# Patient Record
Sex: FEMALE | Race: OTHER
[De-identification: names, ages, dates, MRNs, and addresses within clinical notes are randomized per-mention and may not be internally consistent; named-entity substitution may affect disease eponyms.]

---

## 2019-11-30 ENCOUNTER — HOSPITAL ENCOUNTER (EMERGENCY)
Dept: HOSPITAL 61 - ER | Age: 39
Discharge: HOME | End: 2019-11-30
Payer: SELF-PAY

## 2019-11-30 VITALS — DIASTOLIC BLOOD PRESSURE: 88 MMHG | SYSTOLIC BLOOD PRESSURE: 143 MMHG

## 2019-11-30 VITALS — BODY MASS INDEX: 30.05 KG/M2 | WEIGHT: 176 LBS | HEIGHT: 64 IN

## 2019-11-30 DIAGNOSIS — J02.9: Primary | ICD-10-CM

## 2019-11-30 DIAGNOSIS — J45.909: ICD-10-CM

## 2019-11-30 DIAGNOSIS — Z91.040: ICD-10-CM

## 2019-11-30 PROCEDURE — 99283 EMERGENCY DEPT VISIT LOW MDM: CPT

## 2019-11-30 NOTE — PHYS DOC
Past Medical History


Past Medical History:  Asthma


 (HODA RAMOS)


Past Surgical History:  Tubal ligation


 (HODA RAMOS)


Alcohol Use:  Occasionally


Drug Use:  None


 (HODA RAMOS)


Attending Signature


I have participated in the care of this patient and I have reviewed and agree 

with all pertinent clinical information above including history, exam, and 

recommendations.





 (ANDRIA BENAVIDES MD)





Adult General


Chief Complaint


Chief Complaint:  SORE THROAT





HPI


HPI





Patient is a 39  year old female who presents to the emergency department with 

complaints of sore throat for the last 5 days. Patient states that she has also 

felt like she's had a fever. She denies any cough, shortness of breath, 

wheezing, nausea, vomiting, diarrhea, abdominal pain, or difficulty swallowing. 

Patient states that both of her ears also hurt. She currently rates her 

discomfort a 5 out of 10 on the pain scale. She denies taking anything for 

relief of her fever or pain prior to arrival. All other ROS is neg unless 

otherwise noted in HPI.


 (HODA RAMOS)





Review of Systems


Review of Systems


See Above


 (HODA RAMOS)





Current Medications


Current Medications





Current Medications








 Medications


  (Trade)  Dose


 Ordered  Sig/Lakeisha  Start Time


 Stop Time Status Last Admin


Dose Admin


 


 Acetaminophen


  (Tylenol)  1,000 mg  1X  ONCE  11/30/19 19:30


 11/30/19 19:31 DC 11/30/19 19:24


1,000 MG





 (ANDRIA BENAVIDES MD)





Allergies


Allergies





Allergies








Coded Allergies Type Severity Reaction Last Updated Verified


 


  latex Allergy Severe Hives 11/30/19 Yes





 (ANDRIA BENAVIDES MD)





Physical Exam


Physical Exam


See Above


Constitutional: Well developed, well nourished, no acute distress, non-toxic 

appearance. []


HENT: Normocephalic, atraumatic, bilateral external ears normal, 2+ tonsils 

bilaterally with moderate erythema of posterior pharynx, oropharynx moist, no 

oral exudates, nose normal. []


Eyes: PERRLA, EOMI, conjunctiva normal, no discharge. [] 


Neck: Normal range of motion, bilateral enlarged anterior cervical chain lymph 

nodes, no stridor. [] 


Cardiovascular:Heart rate regular rhythm, no murmur []


Lungs & Thorax:  Bilateral breath sounds clear to auscultation []


Skin: Flushed, hot, dry; no rash


Back: No tenderness


Extremities: No cyanosis, ROM intact


Neurologic: Alert and oriented X 3, no focal deficits noted. []


Psychologic: Affect normal, judgement normal, mood normal. []


 (HODA RAMOS)





Current Patient Data


Vital Signs





                                   Vital Signs








  Date Time  Temp Pulse Resp B/P (MAP) Pulse Ox O2 Delivery O2 Flow Rate FiO2


 


11/30/19 18:55 100.4 93 16 143/88 (106) 98 Room Air  





 100.4       





 (ANDRIA BENAVIDES MD)





EKG


EKG


[]


 (HODA RAMOS)





Radiology/Procedures


Radiology/Procedures


[]


 (HODA RAMOS)





Course & Med Decision Making


Course & Med Decision Making


Pertinent Labs and Imaging studies reviewed. (See chart for details)


 is a 39-year-old female who presented to the emergency department with 

complaints of a sore throat for the last 5 days. Patient had a fever temperature

 was 100.4 in the department. Physical exam is concerning for strep pharyngitis.

 Decision to treat patient based off the center criteria was made. Prescription 

written for amoxicillin. Patient was given 1 g of Tylenol while in the emergency

 department. Recommend salt water gargles, Tylenol or ibuprofen as needed for 

fever or pain. Follow-up with her primary care doctor next week. Return to the 

ER symptoms worsen.





Patient verbalized an understanding of home care, medications, follow-up, and 

return to ED instructions and was in agreement with the plan of care.


[]


 (HODA RAMOS)





Dragon Disclaimer


Dragon Disclaimer


This electronic medical record was generated, in whole or in part, using a voice

 recognition dictation system.


 (HODA RAMOS)





Departure


Departure


Impression:  


   Primary Impression:  


   Pharyngitis, acute


   Additional Impression:  


   Fever


Disposition:  01 HOME, SELF-CARE


Condition:  STABLE


Referrals:  


NO PCP (PCP)


Patient Instructions:  Fever, Adult, Easy-to-Read, Viral and Bacterial 

Pharyngitis, Easy-to-Read





Additional Instructions:  


Fill prescription and use as directed. Recommend warm salt water gargles as 

needed for relief of discomfort. Alternate Tylenol and ibuprofen as needed for 

fever/pain. Discard your toothbrush tomorrow and begin using a new toothbrush. 

Follow-up with primary care doctor if symptoms persist. Return to the ER if 

symptoms worsen.


Scripts


Amoxicillin (AMOXICILLIN) 500 Mg Tablet


1 TAB PO BID for 10 Days, #20 TAB 0 Refills


   Prov: HODA RAMOS         11/30/19





Problem Qualifiers








   Primary Impression:  


   Pharyngitis, acute


   Pharyngitis/tonsillitis etiology:  unspecified etiology  Qualified Codes:  

   J02.9 - Acute pharyngitis, unspecified


   Additional Impression:  


   Fever


   Fever type:  unspecified  Qualified Codes:  R50.9 - Fever, unspecified








HODA RAMOS       Nov 30, 2019 19:21


ANDRIA BENAVIDES MD              Nov 30, 2019 23:49

## 2021-11-10 ENCOUNTER — HOSPITAL ENCOUNTER (EMERGENCY)
Dept: HOSPITAL 61 - ER | Age: 41
Discharge: HOME | End: 2021-11-10
Payer: SELF-PAY

## 2021-11-10 VITALS — DIASTOLIC BLOOD PRESSURE: 87 MMHG | SYSTOLIC BLOOD PRESSURE: 140 MMHG

## 2021-11-10 VITALS — HEIGHT: 64 IN | WEIGHT: 189.38 LBS | BODY MASS INDEX: 32.33 KG/M2

## 2021-11-10 DIAGNOSIS — Z98.51: ICD-10-CM

## 2021-11-10 DIAGNOSIS — J45.909: ICD-10-CM

## 2021-11-10 DIAGNOSIS — N94.6: ICD-10-CM

## 2021-11-10 DIAGNOSIS — D25.0: ICD-10-CM

## 2021-11-10 DIAGNOSIS — N83.202: Primary | ICD-10-CM

## 2021-11-10 LAB
ALBUMIN SERPL-MCNC: 3.3 G/DL (ref 3.4–5)
ALBUMIN/GLOB SERPL: 1 {RATIO} (ref 1–1.7)
ALP SERPL-CCNC: 71 U/L (ref 46–116)
ALT SERPL-CCNC: 23 U/L (ref 14–59)
ANION GAP SERPL CALC-SCNC: 9 MMOL/L (ref 6–14)
APTT PPP: YELLOW S
AST SERPL-CCNC: 15 U/L (ref 15–37)
BACTERIA #/AREA URNS HPF: (no result) /HPF
BASOPHILS # BLD AUTO: 0 X10^3/UL (ref 0–0.2)
BASOPHILS NFR BLD: 0 % (ref 0–3)
BILIRUB SERPL-MCNC: 0.2 MG/DL (ref 0.2–1)
BILIRUB UR QL STRIP: NEGATIVE
BUN SERPL-MCNC: 12 MG/DL (ref 7–20)
BUN/CREAT SERPL: 20 (ref 6–20)
CALCIUM SERPL-MCNC: 7.9 MG/DL (ref 8.5–10.1)
CHLORIDE SERPL-SCNC: 102 MMOL/L (ref 98–107)
CO2 SERPL-SCNC: 26 MMOL/L (ref 21–32)
CREAT SERPL-MCNC: 0.6 MG/DL (ref 0.6–1)
EOSINOPHIL NFR BLD: 0.1 X10^3/UL (ref 0–0.7)
EOSINOPHIL NFR BLD: 1 % (ref 0–3)
ERYTHROCYTE [DISTWIDTH] IN BLOOD BY AUTOMATED COUNT: 16.3 % (ref 11.5–14.5)
FIBRINOGEN PPP-MCNC: CLEAR MG/DL
GFR SERPLBLD BASED ON 1.73 SQ M-ARVRAT: 110.7 ML/MIN
GLUCOSE SERPL-MCNC: 94 MG/DL (ref 70–99)
HCT VFR BLD CALC: 31 % (ref 36–47)
HGB BLD-MCNC: 10.1 G/DL (ref 12–15.5)
LIPASE: 82 U/L (ref 73–393)
LYMPHOCYTES # BLD: 2.1 X10^3/UL (ref 1–4.8)
LYMPHOCYTES NFR BLD AUTO: 20 % (ref 24–48)
MCH RBC QN AUTO: 25 PG (ref 25–35)
MCHC RBC AUTO-ENTMCNC: 32 G/DL (ref 31–37)
MCV RBC AUTO: 77 FL (ref 79–100)
MONO #: 0.6 X10^3/UL (ref 0–1.1)
MONOCYTES NFR BLD: 6 % (ref 0–9)
NEUT #: 7.6 X10^3/UL (ref 1.8–7.7)
NEUTROPHILS NFR BLD AUTO: 73 % (ref 31–73)
NITRITE UR QL STRIP: NEGATIVE
PH UR STRIP: 6 [PH]
PLATELET # BLD AUTO: 265 X10^3/UL (ref 140–400)
POTASSIUM SERPL-SCNC: 4.1 MMOL/L (ref 3.5–5.1)
PROT SERPL-MCNC: 6.6 G/DL (ref 6.4–8.2)
PROT UR STRIP-MCNC: NEGATIVE MG/DL
RBC # BLD AUTO: 4.04 X10^6/UL (ref 3.5–5.4)
RBC #/AREA URNS HPF: (no result) /HPF (ref 0–2)
SODIUM SERPL-SCNC: 137 MMOL/L (ref 136–145)
UROBILINOGEN UR-MCNC: 0.2 MG/DL
WBC # BLD AUTO: 10.4 X10^3/UL (ref 4–11)
WBC #/AREA URNS HPF: (no result) /HPF (ref 0–4)

## 2021-11-10 PROCEDURE — 96374 THER/PROPH/DIAG INJ IV PUSH: CPT

## 2021-11-10 PROCEDURE — 96375 TX/PRO/DX INJ NEW DRUG ADDON: CPT

## 2021-11-10 PROCEDURE — 76830 TRANSVAGINAL US NON-OB: CPT

## 2021-11-10 PROCEDURE — 85025 COMPLETE CBC W/AUTO DIFF WBC: CPT

## 2021-11-10 PROCEDURE — 96361 HYDRATE IV INFUSION ADD-ON: CPT

## 2021-11-10 PROCEDURE — 74177 CT ABD & PELVIS W/CONTRAST: CPT

## 2021-11-10 PROCEDURE — 96376 TX/PRO/DX INJ SAME DRUG ADON: CPT

## 2021-11-10 PROCEDURE — 81025 URINE PREGNANCY TEST: CPT

## 2021-11-10 PROCEDURE — 83690 ASSAY OF LIPASE: CPT

## 2021-11-10 PROCEDURE — 99285 EMERGENCY DEPT VISIT HI MDM: CPT

## 2021-11-10 PROCEDURE — 81001 URINALYSIS AUTO W/SCOPE: CPT

## 2021-11-10 PROCEDURE — 80053 COMPREHEN METABOLIC PANEL: CPT

## 2021-11-10 PROCEDURE — 36415 COLL VENOUS BLD VENIPUNCTURE: CPT

## 2021-11-10 NOTE — PHYS DOC
Past Medical History


Past Medical History:  Asthma


Past Surgical History:  Tubal ligation


Smoking Status:  Never Smoker


Alcohol Use:  None


Drug Use:  None





General Adult


EDM:


Chief Complaint:  PELVIC PAIN





HPI:


HPI:





Patient is a 41  year old female who presents with for last 3 months every time 

she starts her period they become very heavy with large clots and tissue passing

and she is in extreme severe pain in the lower abdomen and then radiates over to

the left. She states that she just started her period yesterday and it began 

again. She states right now she is she is bleeding lightly but it will get 

heavy. She states she does not have a gynecologist and has not been seen for 

this in the past. She has had a tubal ligation and a history of asthma. She 

rates her pain at a 7 out of 10 and states it's aching and cramping. She states 

that it feels like contractions. She denies any abnormal vaginal discharge or 

concern for sexually transmitted disease. Patient denies fever, nausea, 

vomiting, diarrhea, headache, dizziness, cough, shortness of breath, chest pain,

syncope, focal weakness, numbness or tingling.





Review of Systems:


Review of Systems:


Constitutional:   Denies fever or chills. []


Eyes:   Denies change in visual acuity. []


HENT:   Denies nasal congestion or sore throat. [] 


Respiratory:   Denies cough or shortness of breath. [] 


Cardiovascular:   Denies chest pain or edema. [] 


GI:   + abdominal pain, denies nausea, vomiting, bloody stools or diarrhea. [] 


:  Denies dysuria. + Heavy vaginal bleeding [] 


Musculoskeletal:   Denies back pain or joint pain. [] 


Integument:   Denies rash. [] 


Neurologic:   Denies headache, focal weakness or sensory changes. [] 


Endocrine:   Denies polyuria or polydipsia. [] 


Lymphatic:  Denies swollen glands. [] 


Psychiatric:  Denies depression or anxiety. []





Heart Score:


C/O Chest Pain:  No





Current Medications:





Current Medications








 Medications


  (Trade)  Dose


 Ordered  Sig/Lakeisha  Start Time


 Stop Time Status Last Admin


Dose Admin


 


 Fentanyl Citrate


  (Fentanyl 2ml


 Vial)  25 mcg  1X  ONCE  11/10/21 13:30


 11/10/21 13:31 UNV  





 


 Sodium Chloride  1,000 ml @ 


 1,000 mls/hr  Q1H  11/10/21 13:30


 11/10/21 14:29   














Allergies:


Allergies:





Allergies








Coded Allergies Type Severity Reaction Last Updated Verified


 


  latex Allergy Severe Hives 19 Yes











Physical Exam:


PE:





Constitutional: Well developed, well nourished, no acute distress, non-toxic 

appearance. []


HENT: Normocephalic, atraumatic, bilateral external ears normal, oropharynx 

moist, no oral exudates, nose normal. []


Eyes: PERRLA, EOMI, conjunctiva normal, no discharge. [] 


Neck: Normal range of motion, no tenderness, supple, no stridor. [] 


Cardiovascular:Heart rate regular rhythm, no murmur []


Lungs & Thorax:  Bilateral breath sounds clear to auscultation []


Abdomen: Bowel sounds normal, soft, no tenderness, no masses, no pulsatile 

masses. [] 


Skin: Warm, dry, no erythema, no rash. [] 


Back: No tenderness, no CVA tenderness. [] 


Extremities: No tenderness, no cyanosis, no clubbing, ROM intact, no edema. [] 


Neurologic: Alert and oriented X 3, normal motor function, normal sensory 

function, no focal deficits noted. []


Psychologic: Affect normal, judgement normal, mood normal. []





**Normal physical exam





Current Patient Data:


Labs:





                                Laboratory Tests








Test


 11/10/21


13:13


 


POC Urine HCG, Qualitative


 Hcg negative


(Negative)








Vital Signs:





                                   Vital Signs








  Date Time  Temp Pulse Resp B/P (MAP) Pulse Ox O2 Delivery O2 Flow Rate FiO2


 


11/10/21 12:55 98.5 97 18 161/86 (111) 99 Room Air  





 98.5       











EKG:


EKG:


[]





Radiology/Procedures:


Radiology/Procedures:


[]


Impression:


                            [facility]


                    [address]  [place], [place] [id number] [phone]


                                        


                                 IMAGING REPORT





                                     Signed





PATIENT: AGUSTIN VENTURA: [id number]     MRN#: [id number]


: 1980           LOCATION: ER              AGE: 41


SEX: F                    EXAM DT: 11/10/21         ACCESSION#: [id number]


STATUS: REG ER            ORD. PHYSICIAN: ROYA GARVIN


REASON: ABDOMINAL PAIN, HEAVY VAGINAL BLEEDING


PROCEDURE: CT ABD PELV W/ IV CONTRST ONLY





EXAM: CT Abdomen and Pelvis with IV contrast





CLINICAL HISTORY: ABDOMINAL PAIN, HEAVY VAGINAL BLEEDING





COMPARISON: none





TECHNIQUE: Helical CT of the abdomen and pelvis was performed following the 

administration of intravenous contrast. Axial, coronal and sagittal reformatted 

images were generated.





PQRS compliance statement - One or more of the following individualized dose 

reduction techniques were utilized for this study:


1.  Automated exposure control


2.  Adjustment of the mA and/or kV according to patient size


3.  Use of iterative reconstruction technique





FINDINGS:


Lower Chest: 


Lung bases are clear.





Abdomen and Pelvis: 


No focal liver lesion. Gallbladder is normal. Spleen is unremarkable. Adrenal 

glands are unremarkable. Symmetric nephrograms. No focal renal lesion. No 

hydronephrosis. No hydronephrosis. No hydroureter. Bladder is unremarkable. 





Moderate colonic stool content is seen. Appendix is normal. No small or large 

bowel dilatation. No bowel obstruction. No abdominal pelvic ascites. No 

abdominal pelvic lymphadenopathy. Left adnexal cyst measures 4.4 x 3.1 cm. Mild 

endometrial prominence.





Aorta is normal in caliber. Tubal ligation clips are seen bilaterally.





Bones: 


No aggressive osseous lesion is seen.





IMPRESSION: 


1.  Left adnexal hypodense/cystic lesion, of uncertain clinical significance, 

possibly hemorrhagic/proteinaceous cyst, should be further assessed by ultrasoun

d.


2.  Endometrial prominence, can also be further assessed by ultrasound although 

it likely physiologic.





Electronically signed by: Kailash Valdez MD (11/10/2021 3:53 PM) [id number]














DICTATED and SIGNED BY:     KAILASH VALDEZ MD


DATE:     11/10/21 [id number]





                            [facility]


                    [address] [place] [address]


                                 [phone]


                                        


                                 IMAGING REPORT





                                     Signed





PATIENT: AGUSTIN VENTURAOUNT: [id number]     MRN#: [id number]


: 1980           LOCATION: ER              AGE: 41


SEX: F                    EXAM DT: 11/10/21         ACCESSION#: [id number]


STATUS: REG ER            ORD. PHYSICIAN: ROYA GARVIN


REASON: abnormal ct scan, pelvic pain, heavy bleeding


PROCEDURE: TRANSVAGINAL





EXAM: ULTRASOUND PELVIS





INDICATION: Reason: abnormal ct scan, pelvic pain, heavy bleeding / Spl. 

Instructions:  / History: .  Last menstrual period was 2021.





COMPARISON: CT 11/10/2021





TECHNIQUE: Transvaginal sonography was performed.





FINDINGS:


Uterus measures 8.2 x 5.8 x 5.1 cm. Endometrium is 2.3 cm in thickness.





Right ovary is not visualized secondary to overlying bowel gas.





Left ovary measures 4.3 x 4.2 x 3.3 cm. Within the left ovary there is a simple 

cyst measuring up to 3.6 cm.





Vascular flow identified in the ovaries bilaterally.





IMPRESSION:


1.  Focal endometrial thickening may relate to endometrial polyp or submucosal 

fibroid. Correlation with direct visualization or nonemergent pelvic MRI is 

recommended.


2.  Simple cyst in the left ovary measuring up to 3.6 cm. No evidence for 

torsion. Right ovary is not visualized.





Electronically signed by: Lauri Hays MD (11/10/2021 4:45 PM) [facility]














DICTATED and SIGNED BY:     LAURI HAYS MD


DATE:     11/10/21 [id number]





Course & Med Decision Making:


Course & Med Decision Making


Pertinent Labs and Imaging studies reviewed. (See chart for details)





See HPI. Alert and oriented x4. Ambulatory steady gait. Speaks in full clear 

sentences. No CVA tenderness. Abdomen is soft and nontender. Afebrile. Skin pink

 warm and dry. Mucous membranes moist.





Blood work unremarkable.  Urinalysis shows no infection.





Ultrasound shows: IMPRESSION:


1.  Focal endometrial thickening may relate to endometrial polyp or submucosal 

fibroid. Correlation with direct visualization or nonemergent pelvic MRI is 

recommended.


2.  Simple cyst in the left ovary measuring up to 3.6 cm. No evidence for 

torsion. Right ovary is not visualized.








Patient will be referred to Dr. Jarvis OB/GYN.  I will also give her resources.











[]





Dragon Disclaimer:


Dragon Disclaimer:


This electronic medical record was generated, in whole or in part, using a voice

 recognition dictation system.





Departure


Departure


Impression:  


   Primary Impression:  


   Ovarian cyst


   Qualified Codes:  N83.202 - Unspecified ovarian cyst, left side


   Additional Impressions:  


   Dysmenorrhea


   Fibroids, submucosal


Disposition:   HOME / SELF CARE / HOMELESS


Condition:  STABLE


Referrals:  


NO PCP (PCP)








POLO JARVIS MD


Patient Instructions:  Dysmenorrhea, Ovarian Cyst, Uterine Fibroid, Easy-to-Read





Additional Instructions:  


Follow-up with a gynecologist as soon as possible.  Drink plenty of fluids.  

Take medication as prescribed and with food.  Remember some pain medications 

will make you sleepy so do not drive or drink alcohol with them.  If you ever 

bleed so heavy that you are going through more than 1 pad an hour you need to 

come to the emergency room.


Scripts


Hydrocodone Bit/Acetaminophen (HYDROCODONE-APAP 5-325  **) 1 Tab Tablet


1 TAB PO PRN Q6HRS PRN for PAIN, #10 TAB 0 Refills


   Prov: ROYA GARVIN         11/10/21











ROYA GARVIN            Nov 10, 2021 13:36

## 2021-11-10 NOTE — RAD
EXAM: CT Abdomen and Pelvis with IV contrast



CLINICAL HISTORY: ABDOMINAL PAIN, HEAVY VAGINAL BLEEDING



COMPARISON: none



TECHNIQUE: Helical CT of the abdomen and pelvis was performed following the administration of intrave
nous contrast. Axial, coronal and sagittal reformatted images were generated.



PQRS compliance statement - One or more of the following individualized dose reduction techniques wer
e utilized for this study:

1.  Automated exposure control

2.  Adjustment of the mA and/or kV according to patient size

3.  Use of iterative reconstruction technique



FINDINGS:

Lower Chest: 

Lung bases are clear.



Abdomen and Pelvis: 

No focal liver lesion. Gallbladder is normal. Spleen is unremarkable. Adrenal glands are unremarkable
. Symmetric nephrograms. No focal renal lesion. No hydronephrosis. No hydronephrosis. No hydroureter.
 Bladder is unremarkable. 



Moderate colonic stool content is seen. Appendix is normal. No small or large bowel dilatation. No poli
wel obstruction. No abdominal pelvic ascites. No abdominal pelvic lymphadenopathy. Left adnexal cyst 
measures 4.4 x 3.1 cm. Mild endometrial prominence.



Aorta is normal in caliber. Tubal ligation clips are seen bilaterally.



Bones: 

No aggressive osseous lesion is seen.



IMPRESSION: 

1.  Left adnexal hypodense/cystic lesion, of uncertain clinical significance, possibly hemorrhagic/pr
oteinaceous cyst, should be further assessed by ultrasound.

2.  Endometrial prominence, can also be further assessed by ultrasound although it likely physiologic
.



Electronically signed by: Kailash Sharma MD (11/10/2021 3:53 PM) [facility]

## 2021-11-10 NOTE — RAD
EXAM: ULTRASOUND PELVIS



INDICATION: Reason: abnormal ct scan, pelvic pain, heavy bleeding / Spl. Instructions:  / History: . 
 Last menstrual period was 11/9/2021.



COMPARISON: CT 11/10/2021



TECHNIQUE: Transvaginal sonography was performed.



FINDINGS:

Uterus measures 8.2 x 5.8 x 5.1 cm. Endometrium is 2.3 cm in thickness.



Right ovary is not visualized secondary to overlying bowel gas.



Left ovary measures 4.3 x 4.2 x 3.3 cm. Within the left ovary there is a simple cyst measuring up to 
3.6 cm.



Vascular flow identified in the ovaries bilaterally.



IMPRESSION:

1.  Focal endometrial thickening may relate to endometrial polyp or submucosal fibroid. Correlation w
ith direct visualization or nonemergent pelvic MRI is recommended.

2.  Simple cyst in the left ovary measuring up to 3.6 cm. No evidence for torsion. Right ovary is not
 visualized.



Electronically signed by: Lauri Gregory MD (11/10/2021 4:45 PM) [facility]